# Patient Record
Sex: FEMALE | Race: WHITE
[De-identification: names, ages, dates, MRNs, and addresses within clinical notes are randomized per-mention and may not be internally consistent; named-entity substitution may affect disease eponyms.]

---

## 2018-03-13 ENCOUNTER — HOSPITAL ENCOUNTER (OUTPATIENT)
Dept: HOSPITAL 75 - PREOP | Age: 38
End: 2018-03-13
Attending: PODIATRIST
Payer: COMMERCIAL

## 2018-03-13 VITALS — SYSTOLIC BLOOD PRESSURE: 138 MMHG | DIASTOLIC BLOOD PRESSURE: 83 MMHG

## 2018-03-13 VITALS — WEIGHT: 267.25 LBS | HEIGHT: 66 IN | BODY MASS INDEX: 42.95 KG/M2

## 2018-03-13 DIAGNOSIS — Z11.2: ICD-10-CM
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
## 2018-03-13 DIAGNOSIS — Z01.818: Primary | ICD-10-CM

## 2018-03-13 DIAGNOSIS — S86.311A: ICD-10-CM

## 2018-03-13 PROCEDURE — 87081 CULTURE SCREEN ONLY: CPT

## 2018-03-23 ENCOUNTER — HOSPITAL ENCOUNTER (OUTPATIENT)
Dept: HOSPITAL 75 - SDC | Age: 38
Discharge: HOME | End: 2018-03-23
Attending: PODIATRIST
Payer: COMMERCIAL

## 2018-03-23 VITALS — DIASTOLIC BLOOD PRESSURE: 77 MMHG | SYSTOLIC BLOOD PRESSURE: 132 MMHG

## 2018-03-23 VITALS — DIASTOLIC BLOOD PRESSURE: 76 MMHG | SYSTOLIC BLOOD PRESSURE: 122 MMHG

## 2018-03-23 VITALS — WEIGHT: 267.25 LBS | HEIGHT: 66 IN | BODY MASS INDEX: 42.95 KG/M2

## 2018-03-23 VITALS — SYSTOLIC BLOOD PRESSURE: 117 MMHG | DIASTOLIC BLOOD PRESSURE: 63 MMHG

## 2018-03-23 VITALS — SYSTOLIC BLOOD PRESSURE: 141 MMHG | DIASTOLIC BLOOD PRESSURE: 77 MMHG

## 2018-03-23 DIAGNOSIS — Z79.84: ICD-10-CM

## 2018-03-23 DIAGNOSIS — E11.9: ICD-10-CM

## 2018-03-23 DIAGNOSIS — S86.311A: Primary | ICD-10-CM
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
## 2018-03-23 DIAGNOSIS — G47.33: ICD-10-CM

## 2018-03-23 DIAGNOSIS — F32.9: ICD-10-CM

## 2018-03-23 DIAGNOSIS — E66.01: ICD-10-CM

## 2018-03-23 DIAGNOSIS — Z79.899: ICD-10-CM

## 2018-03-23 PROCEDURE — 84703 CHORIONIC GONADOTROPIN ASSAY: CPT

## 2018-03-23 NOTE — PROGRESS NOTE-PRE OPERATIVE
Pre-Operative Progress Note


H&P Reviewed


The H&P was reviewed, patient examined and no changes noted.


Date Seen by Provider:  Mar 23, 2018


Time Seen by Provider:  13:05


Date H&P Reviewed:  Mar 23, 2018


Time H&P Reviewed:  13:05


Pre-Operative Diagnosis:  Peroneal tendon tear right











OBDULIA MALCOLM DPM Mar 23, 2018 1:05 pm

## 2018-03-23 NOTE — OPERATIVE REPORT
DATE OF SERVICE:  03/23/2018



SURGEON:

Jo Malcolm DPM



PREOPERATIVE DIAGNOSIS:

Torn peroneus brevis tendon right foot.



POSTOPERATIVE DIAGNOSIS:

Torn peroneus brevis tendon right foot.



PROCEDURE:

Repair of the peroneus brevis tendon right foot.



WOUND CLASS:

Clean.



ANESTHESIA:

General.



HEMOSTASIS:

Pneumatic thigh tourniquet at 300 mmHg.



INDICATION:

This 37-year-old female presents complaining of a painful right foot. 

Conservative therapy was unsatisfactory and the patient would pursue the

magnetic resonant imaging, which resulted in diagnosis of a partial tear

longitudinally from the lateral malleolus down to the 5th metatarsal base area,

which intraoperatively was consistent.  The patient was agreeable to surgical

intervention after risks and complications were discussed at length.  No

guarantees were _____ to the patient and she is willing to proceed.



DESCRIPTION OF PROCEDURE:

The patient was brought back to the operating room table, placed in a secure

supine position.  General anesthetic was then induced.  Appropriate timeout was

performed.  The patient was then placed in a secure lateral position.  The right

lower extremity had a tourniquet placed on the right thigh over several layers

of padding.  The right foot was then prepped and draped in normal sterile

manner.  The right foot was then elevated, allowed to exsanguinate after which

the tourniquet was inflated to 300 mmHg.



Attention was then directed to the lateral aspect of the right foot where a 10

cm longitudinal linear incision was created from the inferior aspect of the

lateral malleolus and a curvilinear incision to the 5th metatarsal base.  The

incision was deepened in the same plane.  Great care to identify and retract all

vital neurovascular structures.  All the necessary blood vessels were cauterized

as encountered.  The incision was deepened down to the peroneal tendon sheath

where the sheath was incised with great care to protect the underlying soft

tissue.  Once the tendon sheath was opened up both the peroneus longus and

brevis were inspected.  There was a longitudinal tear on the anterior aspect of

the peroneus brevis with multiple areas of bulbous tissue along the course of

the peroneus brevis tendon.  No abnormalities were identified to the peroneus

longus tendon.



Next, the peroneus brevis tendon was isolated and a longitudinal _____ tear

incised and the anterior of the tendon was inspected and there is some minor

mucoid areas, but a longitudinal rent was identified and some loose tendon

striations were resected sharply followed by roughening the area up with a hand

rasp.  The wound was flushed with copious amounts of normal saline.  The

majority of the tendon was found to be intact without pathology.  The wound was

flushed once again followed by closure.  A baseball type stitch was utilized to

tubulize the peroneus brevis tendon with 3-0 Vicryl.  The wound was flushed once

again after which closure was performed of the tendon sheath with 3-0 Vicryl

soft tissue and subcutaneous closure was done with 4-0 Vicryl followed by skin

closure with 4-0 Prolene in a horizontal mattress type stitch.  Postoperative

injection consisted of 15 mL of 0.5% Marcaine injecting the local infused at the

surgical site.  Postoperative dressing consisted of Betadine soaked Adaptic,

sterile 4 x 4's, Sof-Rol.  Three ABDs to protect the forefoot heel and posterior

proximal leg followed by application of two Ace wraps over the 4-inch posterior

splint.  The tourniquet was released prior to application of this posterior

splint and capillary refill time was noted to all digits to be within normal

limits.



The patient tolerated the anesthesia and procedure well, was transported from

the operating room to the recovery area with vital signs stable and vascular

status intact to all digits of the right foot.  The patient is to be

nonweightbearing.  I will see her back in our office in 2 weeks' period of time

or sooner if necessary.  She was given a prescription for hydrocodone.





Job ID: [id number]

DocumentID: [id number]

Dictated Date:  03/23/2018 15:03:50

Transcription Date: 03/23/2018 19:31:52

Dictated By: JO MALCOLM DPM

## 2018-03-23 NOTE — ANESTHESIA-GENERAL POST-OP
General


Patient Condition


Mental Status/LOC:  Same as Preop


Cardiovascular:  Satisfactory


Nausea/Vomiting:  Absent


Respiratory:  Satisfactory


Pain:  Controlled


Complications:  Absent





Post Op Complications


Complications


None





Follow Up Care/Instructions


Patient Instructions


None needed.





Anesthesia/Patient Condition


Patient Condition


Patient is doing well, no complaints, stable vital signs, no apparent adverse 

anesthesia problems.   


No complications reported per nursing.











WILFRED GALVEZ CRNA Mar 23, 2018 14:13

## 2018-03-23 NOTE — PHYSICAL THERAPY ORTHO EVAL
PT Orthopedic Evaluation


Type of Surgery








Prior Level of Function


Current Living Status:  Spouse


Locomotion     (Upon Admit):  Independent





Subjective


Subjective


Nauseated.  Agrees to PT.  Spouse present.  Supportive.  has 2 steps to get up 

to her bedroom and bathroom.


Entry Into Home:  Stairs With Railing


Other Obstacles:  2 curb like steps to enter; 2 steps inside





Motor Control


Motor Control:  Motor Control WNL





ROM


ROM:  WFL





Strength


Strength:  WFL





Transfer


Transfers (B, C, W/C) (FIM):  5





Gait


Gait Assistive Device:  FWW


Right Lower Extremity:  Right


Weight Bearing Status RLE:  Non Weight Bearing


Left Lower Extremity:  Left


Weight Bearing Status LLE:  Full Weight Bearing


Summary/Comments


Pt able to hop short distances with FWW NWB right.  Education on safety with 

gait.  Pt has a knee scooter she plans to use.  Education on correct technique 

and use of this. Pt and spouse verbalize understanding.





Treatment Rendered


Treatment:  Gait Train, Step Train


Education on going up/down steps (she does not have a handrail).  Training in 

use of walker and wall for her stairs.  also educated in scooting on her 

buttock technique and how to get up/down from the floor.  Pt and Spouse 

verbalizes understanding. 


Pt able to hop up/down curb step.  Spouse reports they have family that can 

assist as needed.





Assessment/Goals


Goal Time Frame:  1 Visit


Safe Ambulation:  Yes





Plan


Treatment Plan:  Discharge


PT/Family Agrees to Plan:  Yes





Time


Time In:  1550


Time Out:  1630


Total Billed Treatment Time:  40


Billed Treatment Time


visit


EVL 40











JONO MARIANO PT Mar 23, 2018 16:39

## 2018-03-23 NOTE — PROGRESS NOTE-POST OPERATIVE
Post-Operative Progess Note


Surgeon (s)/Assistant (s)


Surgeon


OBDULIA MALCOLM DPM


Assistant:  none





Pre-Operative Diagnosis


Peroneal tendon tear right





Post-Operative Diagnosis





Same





Procedure & Operative Findings


Date of Procedure


3/23/18


Procedure Performed/Findings


Repair of Peroneal Tendon Right


Anesthesia Type


General





Estimated Blood Loss


Estimated blood loss (mL):  Minimal





Specimens/Packing


Specimens Removed


None











OBDULIA MALCOLM DPM Mar 23, 2018 2:52 pm

## 2018-03-23 NOTE — ANESTHESIA-GENERAL POST-OP
General


Patient Condition


Mental Status/LOC:  Same as Preop


Cardiovascular:  Satisfactory


Nausea/Vomiting:  Absent


Respiratory:  Satisfactory


Pain:  Controlled


Complications:  Absent





Post Op Complications


Complications


None





Follow Up Care/Instructions


Patient Instructions


None needed.





Anesthesia/Patient Condition


Patient Condition


Above postop note entered in error. Patient was seen after discharge from PACU 

and doing well. No complaints, stable vital signs, no apparent adverse 

anesthesia problems.   


No complications reported per nursing.











SAMUEL LEY CRNA Mar 23, 2018 16:23

## 2019-03-24 ENCOUNTER — HOSPITAL ENCOUNTER (EMERGENCY)
Dept: HOSPITAL 75 - ER FS | Age: 39
Discharge: HOME | End: 2019-03-24
Payer: COMMERCIAL

## 2019-03-24 VITALS — DIASTOLIC BLOOD PRESSURE: 97 MMHG | SYSTOLIC BLOOD PRESSURE: 165 MMHG

## 2019-03-24 VITALS — BODY MASS INDEX: 41.78 KG/M2 | HEIGHT: 66 IN | WEIGHT: 260 LBS

## 2019-03-24 DIAGNOSIS — Z88.2: ICD-10-CM

## 2019-03-24 DIAGNOSIS — F32.9: ICD-10-CM

## 2019-03-24 DIAGNOSIS — K21.9: ICD-10-CM

## 2019-03-24 DIAGNOSIS — Z79.84: ICD-10-CM

## 2019-03-24 DIAGNOSIS — H66.91: ICD-10-CM

## 2019-03-24 DIAGNOSIS — Z87.448: ICD-10-CM

## 2019-03-24 DIAGNOSIS — H61.21: Primary | ICD-10-CM

## 2019-03-24 PROCEDURE — 99282 EMERGENCY DEPT VISIT SF MDM: CPT

## 2019-03-24 NOTE — ED EENT
History of Present Illness


General


Chief Complaint:  Ear Problems


Stated Complaint:  EARACHE


Source:  patient





History of Present Illness


Date Seen by Provider:  Mar 24, 2019


Time Seen by Provider:  06:10


Timing/Duration:  abrupt, this morning


Severity:  moderate


Location:  ear (R)


Prearrival Treatment:  no prearrival treatment


Associated Symptoms:  No change in hearing, No cough, No fever; nasal congestion

/drainage; No sinus infection





Allergies and Home Medications


Allergies


Coded Allergies:  


     Sulfa (Sulfonamide Antibiotics) (Verified  Allergy, Intermediate, N/V, 3/24

/19)





Home Medications


Buspirone HCl 15 Mg Tablet, 15 MG PO BID, (Reported)


Ciprofloxacin HCl/Dexameth 7.5 Ml Soln, 7.5 ML OT BID


   4 gtts right ear bid 


   Prescribed by: TESFAYE KAUR on 3/24/19 0642


Metformin HCl 500 Mg Tablet, 500 MG PO BID, (Reported)


Omeprazole 10 Mg Capsule.dr, 10 MG PO DAILY, (Reported)





Patient Home Medication List


Home Medication List Reviewed:  Yes





Review of Systems


Review of Systems


Constitutional:  No chills, No fever


Eyes:  No Symptoms Reported


Ears:  Denies Dizziness; Pain; Denies Clear Discharge, Denies Purulent Discharge


Nose:  congestion


Throat:  no symptoms reported


Respiratory:  no symptoms reported


Cardiovascular:  no symptoms reported


Gastrointestinal:  no symptoms reported





Past Medical-Social-Family Hx


Past Med/Social Hx:  Reviewed Nursing Past Med/Soc Hx


Patient Social History


Recent Foreign Travel:  No


Contact w/Someone Who Travel:  No


Recent Hopitalizations:  No





Immunizations Up To Date


Date of Influenza Vaccine:  Oct 16, 2017





Seasonal Allergies


Seasonal Allergies:  Yes





Past Medical History


Reproductive Disorders:  No


Female Reproductive Disorders:  Polycystic Ovarian Dis


Sexually Transmitted Disease:  No


HIV/AIDS:  No


Gastroesophageal Reflux


Loss of Vision:  Denies


Hearing Impairment:  Denies


Depression


Adverse Reaction/Blood Tranf:  No (N/A)





Physical Exam


Vital Signs





Vital Signs - First Documented








 3/24/19 3/24/19





 06:07 06:46


 


Temp 98.2 


 


Pulse 80 


 


Resp  18


 


B/P (MAP) 165/97 (119) 


 


Pulse Ox 100 








Height, Weight, BMI


Height: 5'6.00"


Weight: 267lbs. 4.0oz. 121.354024tu; 43.1 BMI


Method:


General Appearance:  WD/WN, no apparent distress


Eyes:  bilateral eye normal inspection


Ears:  right ear erythema, right ear TM red, right ear other (cerebrum impacted 

); left ear TM normal


Nose:  normal inspection


Mouth/Throat:  normal mouth inspection


Neck:  non-tender, full range of motion


Cardiovascular:  normal peripheral pulses, regular rate, rhythm


Respiratory:  chest non-tender, lungs clear


Gastrointestinal:  non tender, soft


Neurologic/Psychiatric:  no motor/sensory deficits


Skin:  normal color





Procedures/Interventions


Ear :  


   Ear Location:  Right


   Foreign Body Removal:  Impacted Cerumen


   Use of:  Irrigation


Progress/Conclusion


pt with mild otitis media/externa after ceremen cleared





Progress/Results/Core Measures


Results/Orders


Vital Signs/I&O











 3/24/19 3/24/19





 06:07 06:46


 


Temp 98.2 98.2


 


Pulse 80 80


 


Resp  18


 


B/P (MAP) 165/97 (119) 165/97 (119)


 


Pulse Ox 100 100











Departure


Impression





 Primary Impression:  


 Impacted cerumen


 Qualified Codes:  H61.21 - Impacted cerumen, right ear


 Additional Impression:  


 Otitis media


 Qualified Codes:  H66.91 - Otitis media, unspecified, right ear


Disposition:  01 HOME, SELF-CARE


Condition:  Stable





Departure-Patient Inst.


Decision time for Depature:  06:39


Referrals:  


CINDI GARDINER MD (PCP/Family)


Primary Care Physician


Patient Instructions:  Ear Infections (Otitis Media) (DC), [facility]

, Ear Wax Impaction (DC)


Scripts


Ciprofloxacin HCl/Dexameth (Ciprodex Otic Suspension) 7.5 Ml Soln


7.5 ML OT BID for 7 Days, #1 EA 0 Refills


   4 gtts right ear bid


   Prov: TESFAYE KAUR DO         3/24/19











TESFAYE KAUR DO Mar 24, 2019 06:36

## 2019-04-05 ENCOUNTER — HOSPITAL ENCOUNTER (OUTPATIENT)
Dept: HOSPITAL 75 - LABNPT | Age: 39
End: 2019-04-05
Attending: OTOLARYNGOLOGY
Payer: COMMERCIAL

## 2019-04-05 DIAGNOSIS — H92.01: Primary | ICD-10-CM

## 2019-04-05 DIAGNOSIS — H92.21: ICD-10-CM

## 2019-04-05 PROCEDURE — 87070 CULTURE OTHR SPECIMN AEROBIC: CPT
